# Patient Record
Sex: FEMALE | Race: WHITE | Employment: FULL TIME | URBAN - METROPOLITAN AREA
[De-identification: names, ages, dates, MRNs, and addresses within clinical notes are randomized per-mention and may not be internally consistent; named-entity substitution may affect disease eponyms.]

---

## 2017-02-03 ENCOUNTER — TELEPHONE (OUTPATIENT)
Dept: FAMILY MEDICINE CLINIC | Facility: CLINIC | Age: 62
End: 2017-02-03

## 2017-02-03 DIAGNOSIS — M85.80 OSTEOPENIA, UNSPECIFIED LOCATION: Primary | ICD-10-CM

## 2017-02-03 DIAGNOSIS — M85.89 OSTEOPENIA OF MULTIPLE SITES: ICD-10-CM

## 2017-02-03 NOTE — TELEPHONE ENCOUNTER
Pt was diagnosed with osteopenia. She has existing lab orders in system but is wondering if there are any other labs that can be ordered to determine if she has low levels of vitamins/minerals that may cause bone loss.   Pt wants to try vitamins/diet/execi

## 2017-02-03 NOTE — TELEPHONE ENCOUNTER
Call to pt-advised of dr Derotha Goltz comments/recommendations. Pt sts would like to proceed with adding magnesium. Advised magnesium lab order placed-reinforced making sure lab knows she is doing labs ordered from 2 separate dates.    Patient voices unders

## 2017-02-11 RX ORDER — ROSUVASTATIN CALCIUM 5 MG
TABLET ORAL
Qty: 90 TABLET | Refills: 0 | Status: SHIPPED | OUTPATIENT
Start: 2017-02-11 | End: 2017-08-09

## 2017-03-02 ENCOUNTER — APPOINTMENT (OUTPATIENT)
Dept: LAB | Age: 62
End: 2017-03-02
Attending: FAMILY MEDICINE
Payer: COMMERCIAL

## 2017-03-02 DIAGNOSIS — I10 ESSENTIAL HYPERTENSION: ICD-10-CM

## 2017-03-02 DIAGNOSIS — E55.9 VITAMIN D DEFICIENCY: ICD-10-CM

## 2017-03-02 DIAGNOSIS — E78.5 DYSLIPIDEMIA: ICD-10-CM

## 2017-03-02 DIAGNOSIS — E11.9 TYPE 2 DIABETES MELLITUS WITHOUT COMPLICATION, WITHOUT LONG-TERM CURRENT USE OF INSULIN (HCC): ICD-10-CM

## 2017-03-02 DIAGNOSIS — M85.89 OSTEOPENIA OF MULTIPLE SITES: ICD-10-CM

## 2017-03-02 LAB
25-HYDROXYVITAMIN D (TOTAL): 41.4 NG/ML (ref 30–100)
ALBUMIN SERPL-MCNC: 4 G/DL (ref 3.5–4.8)
ALP LIVER SERPL-CCNC: 64 U/L (ref 50–130)
ALT SERPL-CCNC: 25 U/L (ref 14–54)
AST SERPL-CCNC: 11 U/L (ref 15–41)
BILIRUB SERPL-MCNC: 0.4 MG/DL (ref 0.1–2)
BUN BLD-MCNC: 14 MG/DL (ref 8–20)
CALCIUM BLD-MCNC: 9.3 MG/DL (ref 8.3–10.3)
CHLORIDE: 104 MMOL/L (ref 101–111)
CHOLEST SMN-MCNC: 165 MG/DL (ref ?–200)
CO2: 25 MMOL/L (ref 22–32)
CREAT BLD-MCNC: 0.65 MG/DL (ref 0.55–1.02)
CREAT UR-SCNC: <13 MG/DL
EST. AVERAGE GLUCOSE BLD GHB EST-MCNC: 169 MG/DL (ref 68–126)
GLUCOSE BLD-MCNC: 147 MG/DL (ref 70–99)
HAV IGM SER QL: 1.6 MG/DL (ref 1.7–3)
HBA1C MFR BLD HPLC: 7.5 % (ref ?–5.7)
HDLC SERPL-MCNC: 58 MG/DL (ref 45–?)
HDLC SERPL: 2.84 {RATIO} (ref ?–4.44)
LDLC SERPL CALC-MCNC: 78 MG/DL (ref ?–130)
M PROTEIN MFR SERPL ELPH: 7.1 G/DL (ref 6.1–8.3)
MICROALBUMIN UR-MCNC: <0.5 MG/DL
NONHDLC SERPL-MCNC: 107 MG/DL (ref ?–130)
POTASSIUM SERPL-SCNC: 3.7 MMOL/L (ref 3.6–5.1)
SODIUM SERPL-SCNC: 138 MMOL/L (ref 136–144)
TRIGLYCERIDES: 146 MG/DL (ref ?–150)
VLDL: 29 MG/DL (ref 5–40)

## 2017-03-02 PROCEDURE — 82306 VITAMIN D 25 HYDROXY: CPT

## 2017-03-02 PROCEDURE — 80053 COMPREHEN METABOLIC PANEL: CPT

## 2017-03-02 PROCEDURE — 83735 ASSAY OF MAGNESIUM: CPT

## 2017-03-02 PROCEDURE — 82570 ASSAY OF URINE CREATININE: CPT

## 2017-03-02 PROCEDURE — 83036 HEMOGLOBIN GLYCOSYLATED A1C: CPT

## 2017-03-02 PROCEDURE — 82043 UR ALBUMIN QUANTITATIVE: CPT

## 2017-03-02 PROCEDURE — 36415 COLL VENOUS BLD VENIPUNCTURE: CPT

## 2017-03-02 PROCEDURE — 80061 LIPID PANEL: CPT

## 2017-03-08 ENCOUNTER — OFFICE VISIT (OUTPATIENT)
Dept: FAMILY MEDICINE CLINIC | Facility: CLINIC | Age: 62
End: 2017-03-08

## 2017-03-08 VITALS
SYSTOLIC BLOOD PRESSURE: 122 MMHG | HEART RATE: 98 BPM | WEIGHT: 170 LBS | DIASTOLIC BLOOD PRESSURE: 62 MMHG | BODY MASS INDEX: 31.69 KG/M2 | HEIGHT: 61.25 IN | RESPIRATION RATE: 14 BRPM

## 2017-03-08 DIAGNOSIS — Z13.0 SCREENING FOR DEFICIENCY ANEMIA: ICD-10-CM

## 2017-03-08 DIAGNOSIS — E66.09 NON MORBID OBESITY DUE TO EXCESS CALORIES: ICD-10-CM

## 2017-03-08 DIAGNOSIS — I10 ESSENTIAL HYPERTENSION: ICD-10-CM

## 2017-03-08 DIAGNOSIS — E78.5 DYSLIPIDEMIA: ICD-10-CM

## 2017-03-08 DIAGNOSIS — E11.9 TYPE 2 DIABETES MELLITUS WITHOUT COMPLICATION, WITHOUT LONG-TERM CURRENT USE OF INSULIN (HCC): Primary | ICD-10-CM

## 2017-03-08 DIAGNOSIS — M85.89 OSTEOPENIA OF MULTIPLE SITES: ICD-10-CM

## 2017-03-08 DIAGNOSIS — I49.9 IRREGULAR HEART RATE: ICD-10-CM

## 2017-03-08 DIAGNOSIS — E83.42 HYPOMAGNESEMIA: ICD-10-CM

## 2017-03-08 DIAGNOSIS — E55.9 VITAMIN D DEFICIENCY: ICD-10-CM

## 2017-03-08 PROCEDURE — 99214 OFFICE O/P EST MOD 30 MIN: CPT | Performed by: FAMILY MEDICINE

## 2017-03-08 PROCEDURE — 93000 ELECTROCARDIOGRAM COMPLETE: CPT | Performed by: FAMILY MEDICINE

## 2017-03-08 RX ORDER — LISINOPRIL 40 MG/1
TABLET ORAL
Qty: 90 TABLET | Refills: 0 | Status: SHIPPED | OUTPATIENT
Start: 2017-03-08 | End: 2017-06-08

## 2017-03-08 NOTE — PROGRESS NOTES
HPI:   Sylvie Molina is a 64year old female who presents for recheck of her diabetes. Patient’s FBS have been 120-150's. Last visit with ophthalmologist was 6 month ago. Pt.has been checking her feet on a regular basis.  Pt denies any tingling of the fee 25-Hydroxyvitamin D (Total) 41.4 30.0-100.0 ng/mL   -MICROALB/CREAT RATIO, RANDOM URINE   Result Value Ref Range   Microalbumin, Urine <0.50 mg/dL   Creatinine Ur Random <13.00 mg/dL   Malb/Cre Calc  <=30.0 ug/mg   -HEMOGLOBIN A1C   Result Value Ref Rang Comment mole removed right chin    OTHER SURGICAL HISTORY Right 6/5/15    Comment carpal tunnel release    REVISE MEDIAN N/CARPAL TUNNEL SURG Left 8/14/2015    Comment Procedure: CARPAL TUNNEL RELEASE;  Surgeon: Leah Mariscal MD;  Location: [facility] controlled diet and exercise, check feet daily. Check BP at home regularly. CPM.  Metformin 1000 mg BID. Osteopenia  -- exercising  Obese -- focus on portions  Vitamin d def -- 2000iu/d  Ectopic -- PVC -- EKG wnl.        Type 2 diabetes mellitus without

## 2017-03-23 ENCOUNTER — OFFICE VISIT (OUTPATIENT)
Dept: FAMILY MEDICINE CLINIC | Facility: CLINIC | Age: 62
End: 2017-03-23

## 2017-03-23 VITALS
DIASTOLIC BLOOD PRESSURE: 60 MMHG | HEIGHT: 61.75 IN | OXYGEN SATURATION: 99 % | HEART RATE: 88 BPM | BODY MASS INDEX: 31.28 KG/M2 | WEIGHT: 170 LBS | TEMPERATURE: 98 F | RESPIRATION RATE: 16 BRPM | SYSTOLIC BLOOD PRESSURE: 120 MMHG

## 2017-03-23 DIAGNOSIS — J01.80 ACUTE NON-RECURRENT SINUSITIS OF OTHER SINUS: Primary | ICD-10-CM

## 2017-03-23 PROCEDURE — 99214 OFFICE O/P EST MOD 30 MIN: CPT | Performed by: FAMILY MEDICINE

## 2017-03-23 RX ORDER — FLUTICASONE PROPIONATE 50 MCG
1 SPRAY, SUSPENSION (ML) NASAL 2 TIMES DAILY
Qty: 1 BOTTLE | Refills: 0 | Status: SHIPPED | OUTPATIENT
Start: 2017-03-23 | End: 2017-03-23

## 2017-03-23 RX ORDER — DOXYCYCLINE 100 MG/1
100 CAPSULE ORAL EVERY 12 HOURS
Qty: 14 CAPSULE | Refills: 0 | Status: SHIPPED | OUTPATIENT
Start: 2017-03-23 | End: 2017-03-30

## 2017-03-23 RX ORDER — FLUTICASONE PROPIONATE 50 MCG
SPRAY, SUSPENSION (ML) NASAL
Qty: 1 BOTTLE | Refills: 0 | Status: SHIPPED | OUTPATIENT
Start: 2017-03-23 | End: 2017-06-22

## 2017-03-23 NOTE — PROGRESS NOTES
[facility] Family Medicine Office Note  Chief Complaint:   Patient presents with:  Cough: cough on and off x 1 wk, tickle in throat triggers bad coughing, cold more than 1 wk      HPI:   This is a 64year old female coming in for on and off cough numbness & tingling   • Hyperlipidemia    • Essential hypertension    • Vitamin D deficiency    • Osteopenia          Past Surgical History      , ,     Comment three    COLONOSCOPY      Comment dr. Pam Fallon; due in 2018    GENNARO NEED TAKE 1 TABLET BY MOUTH TWICE DAILY WITH MEALS Disp: 180 tablet Rfl: 1   Oyster Calcium 500 MG Oral Tab Take 500 mg by mouth 2 (two) times daily with meals.  Disp:  Rfl:    Cholecalciferol (VITAMIN D) 1000 UNITS Oral Tab Take 1,000 Units by mouth 2 (two) sharon Estimated body mass index is 31.36 kg/(m^2) as calculated from the following:    Height as of this encounter: 61.75\". Weight as of this encounter: 170 lb. Vital signs reviewed. Appears stated age, well groomed.   Physical Exam:  GEN:  Patient is alert, Disp Refills    Fluticasone Propionate 50 MCG/ACT Nasal Suspension 1 Bottle 0      Si spray by Each Nare route 2 (two) times daily.       Doxycycline Monohydrate 100 MG Oral Cap 14 capsule 0      Sig: Take 1 capsule (100 mg total) by mouth every 12 (twe

## 2017-03-23 NOTE — PATIENT INSTRUCTIONS
Self-Care for Sinusitis     Drinking plenty of water can help sinuses drain. Sinusitis can often be managed with self-care. Self-care can keep sinuses moist and make you feel more comfortable. Remember to follow your doctor's instructions closely.  This Colds, flu, and allergies make it more likely for you to get sinusitis. Do your best to prevent sinusitis by preventing these problems. Do what you can to avoid getting colds and other infections. Stay away from things that cause allergies (allergens).  Yohan Hou · Stay away from all types of smoke, which dries out sinus linings. This includes tobacco smoke and chemical smoke in workplace settings. · Use saltwater rinses.   Date Last Reviewed: 10/1/2016  © [id number] The [address]

## 2017-04-20 RX ORDER — FLUTICASONE PROPIONATE 50 MCG
SPRAY, SUSPENSION (ML) NASAL
Qty: 1 BOTTLE | Refills: 0 | OUTPATIENT
Start: 2017-04-20

## 2017-04-20 NOTE — TELEPHONE ENCOUNTER
Fluticasone Propionate was denied by Dr. Joshua Vasquez due to illness being acute. 1. Acute non-recurrent sinusitis of other sinus.

## 2017-05-02 ENCOUNTER — NURSE ONLY (OUTPATIENT)
Dept: ENDOCRINOLOGY CLINIC | Facility: CLINIC | Age: 62
End: 2017-05-02

## 2017-05-02 VITALS
HEART RATE: 89 BPM | BODY MASS INDEX: 32.02 KG/M2 | WEIGHT: 174 LBS | DIASTOLIC BLOOD PRESSURE: 78 MMHG | SYSTOLIC BLOOD PRESSURE: 132 MMHG | HEIGHT: 62 IN

## 2017-05-02 DIAGNOSIS — E11.65 TYPE 2 DIABETES MELLITUS WITH HYPERGLYCEMIA, WITHOUT LONG-TERM CURRENT USE OF INSULIN (HCC): Primary | ICD-10-CM

## 2017-05-02 PROCEDURE — G0108 DIAB MANAGE TRN  PER INDIV: HCPCS

## 2017-05-02 NOTE — PROGRESS NOTES
Gailne Stephan  [id number] was seen for Diabetic Education Initial/Follow up:    Date: 5/2/2017       Assessment:     Assessment: /78 mmHg  Pulse 89  Ht 62\"  Wt 174 lb  BMI 31.82 kg/m2       HEMOGLOBIN A1C (%)   Date Value   04/19/2008 6.5*   --- Value Ref Range   Cholesterol, Total 165 <200 mg/dL   Triglycerides 146 <150 mg/dL   HDL Cholesterol 58 >45 mg/dL   LDL Cholesterol 78 <130 mg/dL   VLDL 29 5-40 mg/dL   Chol/HDL Ratio 2.84 <4.44   Non HDL Chol 107 <130 mg/dL   -VITAMIN D, 25-HYDROXY   Resu to call diabetes center with any questions or concerns. Script sent to preferred pharmacy for glucose test strips and lancets per protocol. Patient verbalized understanding and has no further questions at this time.     Brandie Rush RN,CDE

## 2017-05-10 ENCOUNTER — OFFICE VISIT (OUTPATIENT)
Dept: FAMILY MEDICINE CLINIC | Facility: CLINIC | Age: 62
End: 2017-05-10

## 2017-05-10 VITALS
RESPIRATION RATE: 14 BRPM | TEMPERATURE: 98 F | SYSTOLIC BLOOD PRESSURE: 110 MMHG | WEIGHT: 174 LBS | BODY MASS INDEX: 32.02 KG/M2 | OXYGEN SATURATION: 98 % | HEIGHT: 62 IN | DIASTOLIC BLOOD PRESSURE: 60 MMHG | HEART RATE: 67 BPM

## 2017-05-10 DIAGNOSIS — R05.9 COUGH: ICD-10-CM

## 2017-05-10 DIAGNOSIS — J31.0 RHINITIS, UNSPECIFIED TYPE: Primary | ICD-10-CM

## 2017-05-10 PROCEDURE — 99213 OFFICE O/P EST LOW 20 MIN: CPT | Performed by: FAMILY MEDICINE

## 2017-05-10 NOTE — PROGRESS NOTES
Toyin Mirza is a 64year old female. HPI:   Patient is here for follow-up on postnasal drip. She has noted that over the past 4 years, she has been clearing her throat more. She is not sure if it is more pronounced since being sick in March.   She do bilateral numbness & tingling   • Hyperlipidemia    • Essential hypertension    • Vitamin D deficiency    • Osteopenia       Social History:    Smoking Status: Never Smoker                      Smokeless Status: Never Used                        Alcohol Us Temp(Src) 98.3 °F (36.8 °C) (Oral)  Resp 14  Ht 62\"  Wt 174 lb  BMI 31.82 kg/m2  SpO2 98%  GENERAL: well developed, well nourished,in no apparent distress  SKIN: no rashes,no suspicious lesions  HEENT: atraumatic, normocephalic,ears and throat are clear -

## 2017-05-11 ENCOUNTER — TELEPHONE (OUTPATIENT)
Dept: FAMILY MEDICINE CLINIC | Facility: CLINIC | Age: 62
End: 2017-05-11

## 2017-05-11 NOTE — TELEPHONE ENCOUNTER
Per note from diabetes education vs 5/2 pt is to Test BG fasting and 2 hours post meals 2 times/day. Strips and lancets sent for pt.

## 2017-05-11 NOTE — TELEPHONE ENCOUNTER
Contour next EZ - pt needs order for strips and lanced's for new machine please advise [phone] cell     Local pharmacy

## 2017-05-17 RX ORDER — ROSUVASTATIN CALCIUM 5 MG
TABLET ORAL
Qty: 90 TABLET | Refills: 0 | Status: SHIPPED | OUTPATIENT
Start: 2017-05-17 | End: 2017-05-25

## 2017-05-25 ENCOUNTER — OFFICE VISIT (OUTPATIENT)
Dept: FAMILY MEDICINE CLINIC | Facility: CLINIC | Age: 62
End: 2017-05-25

## 2017-05-25 VITALS
TEMPERATURE: 100 F | RESPIRATION RATE: 16 BRPM | OXYGEN SATURATION: 98 % | DIASTOLIC BLOOD PRESSURE: 62 MMHG | HEART RATE: 94 BPM | WEIGHT: 170 LBS | SYSTOLIC BLOOD PRESSURE: 120 MMHG | HEIGHT: 62 IN | BODY MASS INDEX: 31.28 KG/M2

## 2017-05-25 DIAGNOSIS — J06.9 UPPER RESPIRATORY TRACT INFECTION, UNSPECIFIED TYPE: Primary | ICD-10-CM

## 2017-05-25 PROCEDURE — 99214 OFFICE O/P EST MOD 30 MIN: CPT | Performed by: FAMILY MEDICINE

## 2017-05-25 NOTE — PROGRESS NOTES
[facility] Family Medicine Office Note  Chief Complaint:   Patient presents with:  Cold: symptoms x 2 days, chills, muscle aches, swollen glands, nasal drainage, HA.      HPI:   This is a 64year old female coming in for cold sxs x 2 days.  No fev • Essential hypertension    • Vitamin D deficiency    • Osteopenia          Past Surgical History      , ,     Comment three    COLONOSCOPY      Comment dr. Dom Burleson; due in 2018    GENNARO NEEDLE LOCALIZATION W/ SPECIMEN 1 SITE LEFT 0   CRESTOR 5 MG Oral Tab TAKE 1 TABLET BY MOUTH EVERY EVENING Disp: 90 tablet Rfl: 0   METFORMIN HCL 1000 MG Oral Tab TAKE 1 TABLET BY MOUTH TWICE DAILY WITH MEALS Disp: 180 tablet Rfl: 1   Oyster Calcium 500 MG Oral Tab Take 500 mg by mouth 2 (two) times Pulse 94  Temp(Src) 99.7 °F (37.6 °C) (Oral)  Resp 16  Ht 62\"  Wt 170 lb  BMI 31.09 kg/m2  SpO2 98% Estimated body mass index is 31.09 kg/(m^2) as calculated from the following:    Height as of this encounter: 62\". Weight as of this encounter: 170 lb. Outcome: Patient verbalizes understanding. Patient is notified to call with any questions, complications, allergies, or worsening or changing symptoms. Patient is to call with any side effects or complications from the treatments as a result of today.

## 2017-05-30 ENCOUNTER — OFFICE VISIT (OUTPATIENT)
Dept: FAMILY MEDICINE CLINIC | Facility: CLINIC | Age: 62
End: 2017-05-30

## 2017-05-30 VITALS
HEIGHT: 62 IN | SYSTOLIC BLOOD PRESSURE: 124 MMHG | RESPIRATION RATE: 16 BRPM | WEIGHT: 171 LBS | HEART RATE: 72 BPM | DIASTOLIC BLOOD PRESSURE: 72 MMHG | TEMPERATURE: 98 F | OXYGEN SATURATION: 99 % | BODY MASS INDEX: 31.47 KG/M2

## 2017-05-30 DIAGNOSIS — J01.80 ACUTE NON-RECURRENT SINUSITIS OF OTHER SINUS: Primary | ICD-10-CM

## 2017-05-30 DIAGNOSIS — I10 ESSENTIAL HYPERTENSION WITH GOAL BLOOD PRESSURE LESS THAN 140/90: ICD-10-CM

## 2017-05-30 PROCEDURE — 99214 OFFICE O/P EST MOD 30 MIN: CPT | Performed by: FAMILY MEDICINE

## 2017-05-30 RX ORDER — MULTIVITAMIN WITH IRON
250 TABLET ORAL DAILY
COMMUNITY

## 2017-05-30 RX ORDER — CEFUROXIME AXETIL 250 MG/1
250 TABLET ORAL EVERY 12 HOURS
Qty: 20 TABLET | Refills: 0 | Status: SHIPPED | OUTPATIENT
Start: 2017-05-30 | End: 2017-06-09

## 2017-05-30 RX ORDER — ASCORBIC ACID 500 MG
500 TABLET ORAL 2 TIMES DAILY
COMMUNITY

## 2017-05-30 NOTE — PATIENT INSTRUCTIONS
Self-Care for Sinusitis     Drinking plenty of water can help sinuses drain. Sinusitis can often be managed with self-care. Self-care can keep sinuses moist and make you feel more comfortable. Remember to follow your doctor's instructions closely.  This Controlling High Blood Pressure  High blood pressure (hypertension) is often called the silent killer. This is because many people who have it don’t know it.  High blood pressure is defined as 140/90 mm Hg or higher. Know your blood pressure and remember to · Make time to relax and enjoy life. Find time to laugh. · Communicate your concerns with your loved ones and your healthcare provider. · Visit with family and friends, and keep up with hobbies.   Limit alcohol and quit smoking  · Men should have no more

## 2017-05-30 NOTE — PROGRESS NOTES
[facility] Family Medicine Office Note  Chief Complaint:   Patient presents with: Follow - Up: x 1 week mucus, follow up from last visit. HPI:   This is a 64year old female coming in for f/up on cold sxs.  No fever, continue to have nasal Comment three    COLONOSCOPY  5/08    Comment dr. Angeli Simmons; due in 2018    GENNARO NEEDLE LOCALIZATION W/ SPECIMEN 1 SITE LEFT  2004    Comment 2 sites= benign.     REVISE MEDIAN N/CARPAL TUNNEL SURG Right 6/5/2015    Comment Procedure: CARPAL TUNNEL RELEASE;  S Tab TAKE ONE TABLET BY MOUTH DAILY Disp: 90 tablet Rfl: 0   CRESTOR 5 MG Oral Tab TAKE 1 TABLET BY MOUTH EVERY EVENING Disp: 90 tablet Rfl: 0   METFORMIN HCL 1000 MG Oral Tab TAKE 1 TABLET BY MOUTH TWICE DAILY WITH MEALS Disp: 180 tablet Rfl: 1   Oyster Ca excessive sweating, cold or heat intolerance, polyuria or polydipsia. ALLERGIES:  Denies sneezing, hives, eczema, + rhinitis.      EXAM:   /72 mmHg  Pulse 72  Temp(Src) 98.1 °F (36.7 °C) (Oral)  Resp 16  Ht 62\"  Wt 171 lb  BMI 31.27 kg/m2  SpO2 99% than 140/90  Recheck of BP better, CPM, low salt diet, monitor.       Meds & Refills for this Visit:    Signed Prescriptions Disp Refills    Cefuroxime Axetil 250 MG Oral Tab 20 tablet 0      Sig: Take 1 tablet (250 mg total) by mouth every 12 (twelve) hour

## 2017-06-08 RX ORDER — LISINOPRIL 40 MG/1
TABLET ORAL
Qty: 90 TABLET | Refills: 0 | Status: SHIPPED | OUTPATIENT
Start: 2017-06-08

## 2017-06-22 ENCOUNTER — LAB ENCOUNTER (OUTPATIENT)
Dept: LAB | Age: 62
End: 2017-06-22
Attending: FAMILY MEDICINE
Payer: COMMERCIAL

## 2017-06-22 DIAGNOSIS — E78.5 DYSLIPIDEMIA: ICD-10-CM

## 2017-06-22 DIAGNOSIS — Z13.0 SCREENING FOR DEFICIENCY ANEMIA: ICD-10-CM

## 2017-06-22 DIAGNOSIS — E11.9 TYPE 2 DIABETES MELLITUS WITHOUT COMPLICATION, WITHOUT LONG-TERM CURRENT USE OF INSULIN (HCC): ICD-10-CM

## 2017-06-22 DIAGNOSIS — E55.9 VITAMIN D DEFICIENCY: ICD-10-CM

## 2017-06-22 DIAGNOSIS — E83.42 HYPOMAGNESEMIA: ICD-10-CM

## 2017-06-22 PROCEDURE — 83036 HEMOGLOBIN GLYCOSYLATED A1C: CPT | Performed by: FAMILY MEDICINE

## 2017-06-22 PROCEDURE — 36415 COLL VENOUS BLD VENIPUNCTURE: CPT | Performed by: FAMILY MEDICINE

## 2017-06-22 PROCEDURE — 80050 GENERAL HEALTH PANEL: CPT | Performed by: FAMILY MEDICINE

## 2017-06-22 PROCEDURE — 82306 VITAMIN D 25 HYDROXY: CPT | Performed by: FAMILY MEDICINE

## 2017-06-22 PROCEDURE — 83735 ASSAY OF MAGNESIUM: CPT | Performed by: FAMILY MEDICINE

## 2017-06-22 PROCEDURE — 80061 LIPID PANEL: CPT | Performed by: FAMILY MEDICINE

## 2017-06-22 RX ORDER — FLUTICASONE PROPIONATE 50 MCG
SPRAY, SUSPENSION (ML) NASAL
Qty: 1 BOTTLE | Refills: 0 | Status: SHIPPED | OUTPATIENT
Start: 2017-06-22 | End: 2017-07-17

## 2017-06-23 RX ORDER — FLUTICASONE PROPIONATE 50 MCG
SPRAY, SUSPENSION (ML) NASAL
Refills: 0 | OUTPATIENT
Start: 2017-06-23

## 2017-06-28 ENCOUNTER — OFFICE VISIT (OUTPATIENT)
Dept: FAMILY MEDICINE CLINIC | Facility: CLINIC | Age: 62
End: 2017-06-28

## 2017-06-28 VITALS
RESPIRATION RATE: 14 BRPM | HEART RATE: 72 BPM | DIASTOLIC BLOOD PRESSURE: 70 MMHG | WEIGHT: 170 LBS | SYSTOLIC BLOOD PRESSURE: 120 MMHG | BODY MASS INDEX: 31.28 KG/M2 | HEIGHT: 62 IN

## 2017-06-28 DIAGNOSIS — J31.0 RHINITIS, UNSPECIFIED TYPE: ICD-10-CM

## 2017-06-28 DIAGNOSIS — E55.9 VITAMIN D DEFICIENCY: ICD-10-CM

## 2017-06-28 DIAGNOSIS — I10 ESSENTIAL HYPERTENSION: ICD-10-CM

## 2017-06-28 DIAGNOSIS — M85.89 OSTEOPENIA OF MULTIPLE SITES: ICD-10-CM

## 2017-06-28 DIAGNOSIS — E78.5 DYSLIPIDEMIA: ICD-10-CM

## 2017-06-28 DIAGNOSIS — E66.3 OVERWEIGHT (BMI 25.0-29.9): ICD-10-CM

## 2017-06-28 DIAGNOSIS — E83.42 HYPOMAGNESEMIA: ICD-10-CM

## 2017-06-28 DIAGNOSIS — E11.9 TYPE 2 DIABETES MELLITUS WITHOUT COMPLICATION, WITHOUT LONG-TERM CURRENT USE OF INSULIN (HCC): Primary | ICD-10-CM

## 2017-06-28 DIAGNOSIS — E66.09 NON MORBID OBESITY DUE TO EXCESS CALORIES: ICD-10-CM

## 2017-06-28 PROCEDURE — 99214 OFFICE O/P EST MOD 30 MIN: CPT | Performed by: FAMILY MEDICINE

## 2017-06-28 RX ORDER — GLIPIZIDE 5 MG/1
5 TABLET ORAL
Qty: 90 TABLET | Refills: 0 | Status: SHIPPED | OUTPATIENT
Start: 2017-06-28

## 2017-06-28 NOTE — PROGRESS NOTES
HPI:   Wellington Randle is a 58year old female who presents for recheck of her diabetes. Patient’s FBS have been 120-150's. Last visit with ophthalmologist was 6 month ago. Pt.has been checking her feet on a regular basis.  Pt denies any tingling of the fee Cholesterol 51 >45 mg/dL   LDL Cholesterol 101 <130 mg/dL   VLDL 25 5 - 40 mg/dL   Chol/HDL Ratio 3.47 <4.44   Non HDL Chol 126 <130 mg/dL   -HEMOGLOBIN A1C   Result Value Ref Range   HgbA1C 7.8 (H) <5.7 %   Estimated Average Glucose 177 (H) 68 - 126 mg/dL Disp:  Rfl:    Glucose Blood (DEXTER CONTOUR NEXT TEST) In Vitro Strip Test BG fasting and 2 hours post meals 2 times/day. Use as directed.  Disp: 200 strip Rfl: 3   DEXTER MICROLET LANCETS Does not apply Misc Test BG fasting and 2 hours post meals 2 times/day Location: [facility]   Social History: Smoking status: Never Smoker                                                              Smokeless tobacco: Never Used                      Alcohol use: Yes           1.0 oz/week     Cans of beer: 2 per week BID.  Osteopenia  -- exercising  Obese -- focus on portions  Vitamin d def -- 2000iu/d       Type 2 diabetes mellitus without complication, without long-term current use of insulin (hcc)  (primary encounter diagnosis)  Overweight (bmi 25.0-29. 9)  Essential

## 2017-06-30 ENCOUNTER — TELEPHONE (OUTPATIENT)
Dept: FAMILY MEDICINE CLINIC | Facility: CLINIC | Age: 62
End: 2017-06-30

## 2017-06-30 DIAGNOSIS — R05.9 COUGH: Primary | ICD-10-CM

## 2017-06-30 DIAGNOSIS — R06.02 SHORTNESS OF BREATH: ICD-10-CM

## 2017-06-30 NOTE — TELEPHONE ENCOUNTER
S/w pt. All questions answered regarding labs. She is wondering however if you feel she should have a breathing test to check her lung fxn since she is exposed to certain chemicals at her work?  She indicates there was a discussion about the chemicals at he

## 2017-07-03 ENCOUNTER — HOSPITAL ENCOUNTER (OUTPATIENT)
Dept: GENERAL RADIOLOGY | Age: 62
Discharge: HOME | End: 2017-07-03
Attending: FAMILY MEDICINE
Payer: COMMERCIAL

## 2017-07-03 ENCOUNTER — TELEPHONE (OUTPATIENT)
Dept: FAMILY MEDICINE CLINIC | Facility: CLINIC | Age: 62
End: 2017-07-03

## 2017-07-03 ENCOUNTER — OFFICE VISIT (OUTPATIENT)
Dept: FAMILY MEDICINE CLINIC | Facility: CLINIC | Age: 62
End: 2017-07-03

## 2017-07-03 VITALS
DIASTOLIC BLOOD PRESSURE: 62 MMHG | OXYGEN SATURATION: 98 % | BODY MASS INDEX: 31.28 KG/M2 | WEIGHT: 170 LBS | TEMPERATURE: 98 F | HEART RATE: 112 BPM | RESPIRATION RATE: 16 BRPM | HEIGHT: 62 IN | SYSTOLIC BLOOD PRESSURE: 108 MMHG

## 2017-07-03 DIAGNOSIS — R05.9 COUGH: ICD-10-CM

## 2017-07-03 DIAGNOSIS — J20.9 BRONCHITIS WITH BRONCHOSPASM: ICD-10-CM

## 2017-07-03 DIAGNOSIS — R05.9 COUGH: Primary | ICD-10-CM

## 2017-07-03 PROCEDURE — 99214 OFFICE O/P EST MOD 30 MIN: CPT | Performed by: FAMILY MEDICINE

## 2017-07-03 PROCEDURE — 71020 XR CHEST PA + LAT CHEST (CPT=71020): CPT | Performed by: FAMILY MEDICINE

## 2017-07-03 PROCEDURE — 94640 AIRWAY INHALATION TREATMENT: CPT | Performed by: FAMILY MEDICINE

## 2017-07-03 RX ORDER — AZITHROMYCIN 250 MG/1
TABLET, FILM COATED ORAL
Qty: 6 TABLET | Refills: 0 | Status: SHIPPED | OUTPATIENT
Start: 2017-07-03 | End: 2017-07-12 | Stop reason: ALTCHOICE

## 2017-07-03 RX ORDER — ALBUTEROL SULFATE 2.5 MG/3ML
2.5 SOLUTION RESPIRATORY (INHALATION) ONCE
Status: COMPLETED | OUTPATIENT
Start: 2017-07-03 | End: 2017-07-03

## 2017-07-03 RX ADMIN — ALBUTEROL SULFATE 2.5 MG: 2.5 SOLUTION RESPIRATORY (INHALATION) at 15:04:00

## 2017-07-03 NOTE — TELEPHONE ENCOUNTER
Pt voiced she was seen last Wed for a db check. At that time, did not have a cough but since this past Sat, pt has had a nonstop cough.  Relayed that for the past 5 years she has been exposed to chemicals due to her job and thinks the cough may be related t

## 2017-07-03 NOTE — PROGRESS NOTES
HPI:   Sharif Clarke is a 58year old female who presents for upper respiratory symptoms for  5  days. Patient reports congestion, dry cough, wheezing, denies fever. Patient also has had a chronic cough that she has seen Dr. Geni Forbes for.       Current Ou Osteopenia    • Vitamin D deficiency       Past Surgical History:  [address]:       Comment: three  : COLONOSCOPY      Comment: dr. Bucky Bush; due in 2018  2004: GENNARO NEEDLE LOCALIZATION W/ SPECIMEN 1 SITE LEFT      Comment: 2 sites= david no sinus tenderness  NECK: supple,no adenopathy,no bruits  LUNGS: There is diffuse mainly expiratory wheezes noted. Slightly better after neb treatment.   CARDIO: RRR without murmur  EXT: no cyanosis or clubbing    Chest x-ray: Compatible with bronchitis

## 2017-07-05 ENCOUNTER — TELEPHONE (OUTPATIENT)
Dept: FAMILY MEDICINE CLINIC | Facility: CLINIC | Age: 62
End: 2017-07-05

## 2017-07-05 ENCOUNTER — NURSE ONLY (OUTPATIENT)
Dept: RESPIRATORY THERAPY | Facility: HOSPITAL | Age: 62
End: 2017-07-05
Attending: FAMILY MEDICINE
Payer: COMMERCIAL

## 2017-07-05 DIAGNOSIS — J20.9 BRONCHITIS WITH BRONCHOSPASM: ICD-10-CM

## 2017-07-05 DIAGNOSIS — R06.02 SHORTNESS OF BREATH: ICD-10-CM

## 2017-07-05 DIAGNOSIS — R06.2 WHEEZING: ICD-10-CM

## 2017-07-05 DIAGNOSIS — R05.9 COUGH: ICD-10-CM

## 2017-07-05 DIAGNOSIS — R05.3 PERSISTENT COUGH: Primary | ICD-10-CM

## 2017-07-05 PROCEDURE — 94726 PLETHYSMOGRAPHY LUNG VOLUMES: CPT

## 2017-07-05 PROCEDURE — 94729 DIFFUSING CAPACITY: CPT

## 2017-07-05 PROCEDURE — 94060 EVALUATION OF WHEEZING: CPT

## 2017-07-05 NOTE — TELEPHONE ENCOUNTER
Pt called our office and requested for Dr. Harrison Sandoval to excuse pt from her job. Per pt, she can not go back to work at this time because of the chemicals. Pt's next OV is 7/12 w/Dr. Harrison Sandoval. Pls advise, pt @ [phone]. Thank you.

## 2017-07-05 NOTE — PROCEDURES
Spirometry and flow volume loop appear normal with no evidence of airway obstruction or restriction. There was no change in spirometry after bronchodilator challenge.     Normal TLC, no evidence of restriction    There is mild increased RV / TLC ratio

## 2017-07-05 NOTE — TELEPHONE ENCOUNTER
Call to pt-advised of dr Sameera Gagnon comments, noted below. sts she did PFTs today and was told dr should have results in 2 days. Discussed  is out of ofc tomorrow/returns Friday-we will call with further recommendations as soon as  reviews results.   Pa

## 2017-07-05 NOTE — TELEPHONE ENCOUNTER
I cannot excuse her from her job until we prove it is the chemicals causing her breathing issues and cough. She may want to take a leave until all the testing is does. She has a PFT ordered.

## 2017-07-06 ENCOUNTER — TELEPHONE (OUTPATIENT)
Dept: FAMILY MEDICINE CLINIC | Facility: CLINIC | Age: 62
End: 2017-07-06

## 2017-07-06 NOTE — TELEPHONE ENCOUNTER
She may return back to work anytime.   She may want to wear a mask while at work as to not breath in the chemicals

## 2017-07-06 NOTE — TELEPHONE ENCOUNTER
Pt called and is waiting to hear back from the pulmonologist. She is asking if and when and if she should go back to work. Please advise. Thank you.

## 2017-07-07 NOTE — TELEPHONE ENCOUNTER
Pt called asking what kind of mask is recommended. Just a regular mask like is in the office here, or more of a respirator type mask. Please advise. Thank you. ---

## 2017-07-10 ENCOUNTER — TELEPHONE (OUTPATIENT)
Dept: FAMILY MEDICINE CLINIC | Facility: CLINIC | Age: 62
End: 2017-07-10

## 2017-07-10 NOTE — TELEPHONE ENCOUNTER
pt was wearing mask at work per your sugestion and was asked to leave, she was told to call back decision of not wearing the mask or no longer working there was told to call back today with her decision.  Pt is very upset like her health doesn't matter if s

## 2017-07-11 NOTE — TELEPHONE ENCOUNTER
Lmtcb, ask if pt needs letter faxed to her work place or if she will be picking letter up at apt 7/12. if fax required, please obtain fax number and name of person to make \"attention to\".

## 2017-07-12 ENCOUNTER — OFFICE VISIT (OUTPATIENT)
Dept: FAMILY MEDICINE CLINIC | Facility: CLINIC | Age: 62
End: 2017-07-12

## 2017-07-12 VITALS
DIASTOLIC BLOOD PRESSURE: 70 MMHG | OXYGEN SATURATION: 99 % | HEART RATE: 76 BPM | SYSTOLIC BLOOD PRESSURE: 130 MMHG | RESPIRATION RATE: 16 BRPM | TEMPERATURE: 98 F

## 2017-07-12 DIAGNOSIS — J20.9 BRONCHITIS WITH BRONCHOSPASM: ICD-10-CM

## 2017-07-12 DIAGNOSIS — R53.83 FATIGUE, UNSPECIFIED TYPE: ICD-10-CM

## 2017-07-12 DIAGNOSIS — R05.9 COUGH: Primary | ICD-10-CM

## 2017-07-12 DIAGNOSIS — E66.3 OVERWEIGHT (BMI 25.0-29.9): ICD-10-CM

## 2017-07-12 DIAGNOSIS — Z77.098 EXPOSURE TO CHEMICAL COMPOUNDS: ICD-10-CM

## 2017-07-12 PROCEDURE — 99214 OFFICE O/P EST MOD 30 MIN: CPT | Performed by: FAMILY MEDICINE

## 2017-07-12 NOTE — PROGRESS NOTES
Chaparro Valle is a 58year old female. HPI:   Pt. Is here for follow up on her breathing. Having a meeting 7 am tomorrow about this. Wore mask on Monday and was scolded by 2 of the owners. Off work yesterday.   She was asked to leave the office on Mon Rfl: prn   Blood Glucose Monitoring Suppl (ACCU-CHEK ABIMAEL SMARTVIEW) W/DEVICE Does not apply Kit Check glucose once daily Disp: 1 kit Rfl: 0        Pcn [Penicillins]           Comment:Yeast infection   Past Medical History:   Diagnosis Date   • Diabetes (H DIFFERENTIAL   Result Value Ref Range   WBC 5.7 4.0 - 13.0 x10(3) uL   RBC 4.07 3.80 - 5.10 x10(6)uL   HGB 12.0 12.0 - 16.0 g/dL   HCT 37.9 34.0 - 50.0 %   .0 150.0 - 450.0 10(3)uL   MCV 93.1 81.0 - 100.0 fL   MCH 29.5 27.0 - 33.2 pg   MCHC 31.7 31. THYROGLOBULIN ANTIBODIES [6260] [Q]    Meds & Refills for this Visit:  No prescriptions requested or ordered in this encounter    Imaging & Consults:  None   Saline rinse. Antihistamine like claritin. Flonase. Stop flovent. Not for work given to pt.

## 2017-07-13 ENCOUNTER — TELEPHONE (OUTPATIENT)
Dept: FAMILY MEDICINE CLINIC | Facility: CLINIC | Age: 62
End: 2017-07-13

## 2017-07-13 NOTE — TELEPHONE ENCOUNTER
CHANA To Dr. Crystal Graham office. They only have 1 pulmonologist in the office this am and they have no openings.  Pt was notified and she will keep her appt she has scheduled for 08/21/2017

## 2017-07-13 NOTE — TELEPHONE ENCOUNTER
Pt was here for an OV visit yesterday w/Dr. Juju Harley who recommended pt see a pulmonologist (Dr. Swati love). Pt lost her job and her insurance ends today. She has Cobra but does not know when that goes into effect.   Pt is asking if our office can jolie

## 2017-07-14 ENCOUNTER — APPOINTMENT (OUTPATIENT)
Dept: LAB | Age: 62
End: 2017-07-14
Attending: FAMILY MEDICINE
Payer: COMMERCIAL

## 2017-07-14 DIAGNOSIS — E66.3 OVERWEIGHT (BMI 25.0-29.9): ICD-10-CM

## 2017-07-14 DIAGNOSIS — R53.83 FATIGUE, UNSPECIFIED TYPE: ICD-10-CM

## 2017-07-14 PROCEDURE — 86376 MICROSOMAL ANTIBODY EACH: CPT | Performed by: FAMILY MEDICINE

## 2017-07-14 PROCEDURE — 86800 THYROGLOBULIN ANTIBODY: CPT | Performed by: FAMILY MEDICINE

## 2017-07-14 PROCEDURE — 36415 COLL VENOUS BLD VENIPUNCTURE: CPT | Performed by: FAMILY MEDICINE

## 2017-07-15 LAB
ANTI-THYROGLOBULIN: 291 U/ML (ref ?–60)
ANTI-THYROPEROXIDASE: <28 U/ML (ref ?–60)

## 2017-07-18 DIAGNOSIS — E06.3 AUTOIMMUNE THYROIDITIS: ICD-10-CM

## 2017-07-18 DIAGNOSIS — R94.6 ABNORMAL THYROID FUNCTION TEST: Primary | ICD-10-CM

## 2017-07-19 RX ORDER — FLUTICASONE PROPIONATE 50 MCG
SPRAY, SUSPENSION (ML) NASAL
Qty: 16 G | Refills: 1 | Status: SHIPPED | OUTPATIENT
Start: 2017-07-19

## 2017-07-24 ENCOUNTER — MED REC SCAN ONLY (OUTPATIENT)
Dept: FAMILY MEDICINE CLINIC | Facility: CLINIC | Age: 62
End: 2017-07-24

## 2017-08-09 RX ORDER — ROSUVASTATIN CALCIUM 5 MG
TABLET ORAL
Qty: 90 TABLET | Refills: 0 | Status: SHIPPED | OUTPATIENT
Start: 2017-08-09

## 2019-04-05 ENCOUNTER — PATIENT OUTREACH (OUTPATIENT)
Dept: FAMILY MEDICINE CLINIC | Facility: CLINIC | Age: 64
End: 2019-04-05

## 2019-04-05 NOTE — PROGRESS NOTES
LOV: 07/2017   Patient has dx of HTN and DM. Patient is due for a follow up- please schedule. If she is under the care of another PCP, please have Dr. Peter Barakat name removed from chart, thanks.

## 2019-04-24 ENCOUNTER — PATIENT OUTREACH (OUTPATIENT)
Dept: FAMILY MEDICINE CLINIC | Facility: CLINIC | Age: 64
End: 2019-04-24

## 2019-04-24 NOTE — PROGRESS NOTES
Patient's LOV 07/2017. Patient has dx of DM. Last DM eye exam on file 07/2017. Patient due for follow up appt with Dr. Robyn Wynne schedule.  Please find out if patient has had a DM eye exam in the past 12 months--if yes, please have copy of exam/office

## 2020-03-09 ENCOUNTER — PATIENT OUTREACH (OUTPATIENT)
Dept: FAMILY MEDICINE CLINIC | Facility: CLINIC | Age: 65
End: 2020-03-09

## 2020-03-09 NOTE — PROGRESS NOTES
Patient's LOV 2017---patient is due for annual physical/routine health maintenance- please call patient to schedule.

## 2021-03-09 DIAGNOSIS — Z23 NEED FOR VACCINATION: ICD-10-CM

## (undated) NOTE — MR AVS SNAPSHOT
[facility], [address] [address] [phone]               Thank you for choosing us for your health care visit with Terry Ram DO.   We are glad to serve you and happy to provide you with this summary necessary. INDIVIDUAL FOLLOW-UP APPOINTMENTS To be determined at appointment  INITIAL INDIVIDUAL ASSESSMENTS An assessment and meter training will begin this appointment. Educational needs will be discussed at that time.   STEP ONE SATISFIER This is a 1-ho This list is accurate as of: 5/10/17  6:04 PM.  Always use your most recent med list.                Darrin Earl w/Device Kit   Check glucose once daily           DEXTER CONTOUR TEST Strp   Generic drug:  Glucose Blood   TEST 4 TO 6 TIMES DAILY

## (undated) NOTE — MR AVS SNAPSHOT
[facility] [address], [address] [facility]  [phone]               Thank you for choosing us for your health care visit with Jo Henao MD.  We are glad to serve you and happy to provide you with this summ and, if already in use, will  be reviewed for accuracy and recalculated if necessary. INDIVIDUAL FOLLOW-UP APPOINTMENTS To be determined at appointment  INITIAL INDIVIDUAL ASSESSMENTS An assessment and meter training will begin this appointment.  Education You have a viral upper respiratory illness (URI), which is another term for the common cold. This illness is contagious during the first few days. It is spread through the air by coughing and sneezing.  It may also be spread by direct contact (touching the · Cough with lots of colored sputum (mucus)  · Severe headache; face, neck, or ear pain  · Difficulty swallowing due to throat pain  · Fever of 100.4°F (38°C)  Call 911, or get immediate medical care  Call emergency services right away if any of these occu * MICROLET LANCETS Misc   TEST 4 TO 6 TIMES DAILY           * DEXTER MICROLET LANCETS Misc   Test BG fasting and 2 hours post meals 2 times/day. Use as directed. Oyster Calcium 500 MG Tabs   Take 500 mg by mouth 2 (two) times daily with meals.

## (undated) NOTE — MR AVS SNAPSHOT
[facility],[address], [address] [place] [address] Rd  [phone]               Thank you for choosing us for your health care visit with Romana Samuel RN,CDE.   We are glad to serve you and happy to provide you with Use insulin to carbohydrate ratio/correction factor accurately Accurately treat hypoglycemia using the Rule of 15  Following the assessment, education will be customized to assessed educational needs.   Basal rates, insulin to carbohydrate and correction fa Reason for Today's Visit     Diabetic Counseling           Medical Issues Discussed Today     Type 2 diabetes mellitus with hyperglycemia, without long-term current use of insulin ([facility]    -  Primary      Instructions and Information about Your Health your Zip Code and Date of Birth to complete the sign-up process. If you do not sign up before the expiration date, you must request a new code.     Your unique Food [employer] Bever[employer] Access Code: [email or web address]  Expires: 7/1/2017  6:52 PM    If you have questions, you can ca

## (undated) NOTE — LETTER
Date: 7/11/2017            Patient Name: Kierra Baker              To Whom it may concern: The above patient was seen at the [facility] and is currently under my care for evaluation and treatment of a medical condition.  Her work up for

## (undated) NOTE — LETTER
06/30/20        [facility]  [address] [place] [address]      Dear Leonel Koyanagi,    Your last office visit with Dr. Jose Juan Paul was in 2017. You are due for an annual physical.  Please call the office to schedule with Donald Middleton today.   If you have a new abran

## (undated) NOTE — LETTER
Date: 6/28/2017    Patient Name: Jose Alfredo Kahn          To Whom it may concern: The patient complains of neck strain. She would benefit from massage to help her neck muscles.       Sincerely,      Omid Campos, DO

## (undated) NOTE — MR AVS SNAPSHOT
[facility] [address], [address] [address] [phone]               Thank you for choosing us for your health care visit with DO Jane.   We are glad to serve you and happy to provide you with this summary at that time. STEP ONE SATISFIER This is a 1-hour appointment taught by a registered dietitian and diabetes educator. Half of this appointment time is dedicated to the development of a personalized education plan.   This plan is developed privately in a 1: VITAMIN D, 25-HYDROXY    Complete by:  Jun 01, 2017 (Approximate)    Assoc Dx:  Vitamin D deficiency [E55.9]                 Reason for Today's Visit     Medication Follow-Up     Diabetes           Medical Issues Discussed Today     Dyslipidemia    Hypert Enter your [employer] Activation Code exactly as it appears below along with your Zip Code and Date of Birth to complete the sign-up process. If you do not sign up before the expiration date, you must request a new code.     Your unique [employer] Access Code: [email or web address] Visit [facility] online at  [email or web address]

## (undated) NOTE — MR AVS SNAPSHOT
[facility] [address], [address] [facility]  [phone]               Thank you for choosing us for your health care visit with iLzette Pollack MD.  We are glad to serve you and happy to provide you with this summ and, if already in use, will  be reviewed for accuracy and recalculated if necessary. INDIVIDUAL FOLLOW-UP APPOINTMENTS To be determined at appointment  INITIAL INDIVIDUAL ASSESSMENTS An assessment and meter training will begin this appointment.  Education Self-Care for Sinusitis     Drinking plenty of water can help sinuses drain. Sinusitis can often be managed with self-care. Self-care can keep sinuses moist and make you feel more comfortable. Remember to follow your doctor's instructions closely.  This c Controlling High Blood Pressure  High blood pressure (hypertension) is often called the silent killer. This is because many people who have it don’t know it.  High blood pressure is defined as 140/90 mm Hg or higher. Know your blood pressure and remember to · Make time to relax and enjoy life. Find time to laugh. · Communicate your concerns with your loved ones and your healthcare provider. · Visit with family and friends, and keep up with hobbies.   Limit alcohol and quit smoking  · Men should have no more Generic drug:  Rosuvastatin Calcium   TAKE 1 TABLET BY MOUTH EVERY EVENING           Fluticasone Propionate 50 MCG/ACT Susp   SHAKE LIQUID AND USE 1 SPRAY IN EACH NOSTRIL TWICE DAILY   Commonly known as:  FLONASE           lisinopril 40 MG Tabs   TAKE ONE Enter your [employer] Activation Code exactly as it appears below along with your Zip Code and Date of Birth to complete the sign-up process. If you do not sign up before the expiration date, you must request a new code.     Your unique [employer] Access Code: [email or web address]

## (undated) NOTE — MR AVS SNAPSHOT
[facility], [address] [facility]  [phone]               Thank you for choosing us for your health care visit with Irasema Najera MD.  We are glad to serve you and happy to provide you with this summ training will begin this appointment. Educational needs will be discussed at that time. STEP ONE SATISFIER This is a 1-hour appointment taught by a registered dietitian and diabetes educator.  Half of this appointment time is dedicated to the development o Drinking plenty of water can help sinuses drain. Sinusitis can often be managed with self-care. Self-care can keep sinuses moist and make you feel more comfortable. Remember to follow your doctor's instructions closely.  This can make a big difference in Colds, flu, and allergies make it more likely for you to get sinusitis. Do your best to prevent sinusitis by preventing these problems. Do what you can to avoid getting colds and other infections. Stay away from things that cause allergies (allergens).  Zulema Pastor · Stay away from all types of smoke, which dries out sinus linings. This includes tobacco smoke and chemical smoke in workplace settings. · Use saltwater rinses.   Date Last Reviewed: 10/1/2016  © 0864-2823 The [address] These medications were sent to [place] [id number], [address] [place] [address] [place], [phone], [phone]  [address] [facility], [facility] [id number]     Phone:  [phone]    - Fluticasone Propionate 50 MCG/ACT S